# Patient Record
Sex: MALE | Race: WHITE | NOT HISPANIC OR LATINO
[De-identification: names, ages, dates, MRNs, and addresses within clinical notes are randomized per-mention and may not be internally consistent; named-entity substitution may affect disease eponyms.]

---

## 2017-10-25 ENCOUNTER — RECORD ABSTRACTING (OUTPATIENT)
Age: 42
End: 2017-10-25

## 2017-10-26 ENCOUNTER — RX RENEWAL (OUTPATIENT)
Age: 42
End: 2017-10-26

## 2017-11-27 ENCOUNTER — APPOINTMENT (OUTPATIENT)
Dept: NEUROLOGY | Facility: CLINIC | Age: 42
End: 2017-11-27
Payer: COMMERCIAL

## 2017-11-27 VITALS
HEIGHT: 64 IN | WEIGHT: 147 LBS | BODY MASS INDEX: 25.1 KG/M2 | SYSTOLIC BLOOD PRESSURE: 114 MMHG | OXYGEN SATURATION: 96 % | HEART RATE: 64 BPM | DIASTOLIC BLOOD PRESSURE: 66 MMHG

## 2017-11-27 DIAGNOSIS — Z78.9 OTHER SPECIFIED HEALTH STATUS: ICD-10-CM

## 2017-11-27 PROCEDURE — 99214 OFFICE O/P EST MOD 30 MIN: CPT

## 2017-11-27 RX ORDER — CARBAMAZEPINE 400 MG/1
400 TABLET, EXTENDED RELEASE ORAL
Qty: 120 | Refills: 3 | Status: DISCONTINUED | COMMUNITY
Start: 2017-10-26 | End: 2017-11-27

## 2017-11-28 LAB — CARBAMAZEPINE SERPL-MCNC: 9.4 UG/ML

## 2018-01-26 ENCOUNTER — APPOINTMENT (OUTPATIENT)
Dept: NEUROLOGY | Facility: CLINIC | Age: 43
End: 2018-01-26
Payer: COMMERCIAL

## 2018-01-26 PROCEDURE — 95819 EEG AWAKE AND ASLEEP: CPT

## 2018-01-27 ENCOUNTER — APPOINTMENT (OUTPATIENT)
Dept: NEUROLOGY | Facility: CLINIC | Age: 43
End: 2018-01-27

## 2018-01-27 PROCEDURE — 95953: CPT

## 2018-02-26 ENCOUNTER — APPOINTMENT (OUTPATIENT)
Dept: NEUROLOGY | Facility: CLINIC | Age: 43
End: 2018-02-26
Payer: COMMERCIAL

## 2018-02-26 VITALS
HEIGHT: 64 IN | SYSTOLIC BLOOD PRESSURE: 108 MMHG | OXYGEN SATURATION: 96 % | TEMPERATURE: 98.2 F | BODY MASS INDEX: 25.1 KG/M2 | WEIGHT: 147 LBS | DIASTOLIC BLOOD PRESSURE: 71 MMHG | HEART RATE: 73 BPM

## 2018-02-26 DIAGNOSIS — Z00.00 ENCOUNTER FOR GENERAL ADULT MEDICAL EXAMINATION W/OUT ABNORMAL FINDINGS: ICD-10-CM

## 2018-02-26 PROCEDURE — 99214 OFFICE O/P EST MOD 30 MIN: CPT

## 2018-04-23 ENCOUNTER — APPOINTMENT (OUTPATIENT)
Dept: NEUROLOGY | Facility: CLINIC | Age: 43
End: 2018-04-23

## 2018-06-11 ENCOUNTER — APPOINTMENT (OUTPATIENT)
Dept: NEUROLOGY | Facility: CLINIC | Age: 43
End: 2018-06-11
Payer: COMMERCIAL

## 2018-06-28 ENCOUNTER — APPOINTMENT (OUTPATIENT)
Dept: NEUROLOGY | Facility: CLINIC | Age: 43
End: 2018-06-28

## 2018-07-16 ENCOUNTER — APPOINTMENT (OUTPATIENT)
Dept: NEUROLOGY | Facility: CLINIC | Age: 43
End: 2018-07-16
Payer: COMMERCIAL

## 2018-07-16 VITALS
HEIGHT: 64 IN | OXYGEN SATURATION: 98 % | BODY MASS INDEX: 24.75 KG/M2 | TEMPERATURE: 98.4 F | HEART RATE: 62 BPM | SYSTOLIC BLOOD PRESSURE: 108 MMHG | WEIGHT: 145 LBS | DIASTOLIC BLOOD PRESSURE: 71 MMHG

## 2018-07-16 PROCEDURE — 99214 OFFICE O/P EST MOD 30 MIN: CPT

## 2018-07-17 LAB — CARBAMAZEPINE SERPL-MCNC: 7 UG/ML

## 2018-08-08 ENCOUNTER — APPOINTMENT (OUTPATIENT)
Dept: MRI IMAGING | Facility: HOSPITAL | Age: 43
End: 2018-08-08
Payer: COMMERCIAL

## 2018-08-08 ENCOUNTER — OUTPATIENT (OUTPATIENT)
Dept: OUTPATIENT SERVICES | Facility: HOSPITAL | Age: 43
LOS: 1 days | End: 2018-08-08
Payer: COMMERCIAL

## 2018-08-08 PROCEDURE — 70551 MRI BRAIN STEM W/O DYE: CPT

## 2018-08-08 PROCEDURE — 70551 MRI BRAIN STEM W/O DYE: CPT | Mod: 26

## 2018-11-27 ENCOUNTER — RX RENEWAL (OUTPATIENT)
Age: 43
End: 2018-11-27

## 2019-05-06 ENCOUNTER — APPOINTMENT (OUTPATIENT)
Dept: NEUROLOGY | Facility: CLINIC | Age: 44
End: 2019-05-06
Payer: COMMERCIAL

## 2019-05-06 VITALS
WEIGHT: 145 LBS | BODY MASS INDEX: 24.75 KG/M2 | OXYGEN SATURATION: 96 % | TEMPERATURE: 98.5 F | SYSTOLIC BLOOD PRESSURE: 107 MMHG | HEART RATE: 76 BPM | DIASTOLIC BLOOD PRESSURE: 69 MMHG | HEIGHT: 64 IN

## 2019-05-06 PROCEDURE — 99214 OFFICE O/P EST MOD 30 MIN: CPT

## 2019-05-06 NOTE — HISTORY OF PRESENT ILLNESS
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 \par \par Previous history: \par \par Had a questionable seizure episode at work about 1 month ago (June 18th, 2018). Felt sick to the stomach and started to feel weak. Walked out of a work meeting and was walking to the bathroom and then fell. Lost consciousness for a few minutes. Denies any visual aura before the fall; also denies any tongue bite or bowel/bladder incontinence. This event was witnessed by a co-worker, who said that there was no shaking witnessed. The patient stayed on the ground for a few minutes before getting up on his own. Felt back to his baseline after 10 minutes. \par \par Reports seeing visual aura about 1 time per week with a white/yellow colored light. Usually lasts 1-2 seconds. Reports that about 2 months ago, saw this visual aura on and off every few minutes for half of a day. \par \par Normal seizure semiology: \par Sees a light and then it gets bigger and bigger to the point where it fills the whole visual field. Then progresses to vomiting and whole body shaking. \par \par Reports getting occipital headaches (pressure in the back of the head) intermittently since the last visit. Takes Advil OTC, which alleviates the headache partially. \par \par Reports memory loss / problems with short-term memory. Ex. cannot remember where he placed a drill after a few minutes (works as a ). Says this has been declining over 2-3 years. Neuro-psych testing is waiting to be approved by insurance. Not done yet\par \par AED Meds: \par \par Carbamazepine 400 mg tid \par \par Was recently switched from the brand name Tegretol to the generic carbamazepine 9 months ago. He has many problems w the generic. \par Memory still bad. \par \par Some anxiety.\par \par Having some auras w visual changes. \par

## 2019-05-06 NOTE — PHYSICAL EXAM
[Person] : oriented to person [Place] : oriented to place [Time] : oriented to time [Concentration Intact] : normal concentrating ability [Visual Intact] : visual attention was ~T not ~L decreased [Naming Objects] : no difficulty naming common objects [Repeating Phrases] : no difficulty repeating a phrase [Writing A Sentence] : no difficulty writing a sentence [Fluency] : fluency intact [Comprehension] : comprehension intact [Reading] : reading intact [Past History] : adequate knowledge of personal past history [Cranial Nerves Optic (II)] : visual acuity intact bilaterally,  visual fields full to confrontation, pupils equal round and reactive to light [Cranial Nerves Oculomotor (III)] : extraocular motion intact [Cranial Nerves Trigeminal (V)] : facial sensation intact symmetrically [Cranial Nerves Facial (VII)] : face symmetrical [Cranial Nerves Vestibulocochlear (VIII)] : hearing was intact bilaterally [Motor Tone] : muscle tone was normal in all four extremities [Motor Strength] : muscle strength was normal in all four extremities [No Muscle Atrophy] : normal bulk in all four extremities [Motor Handedness Right-Handed] : the patient is right hand dominant [Sensation Tactile Decrease] : light touch was intact [Balance] : balance was intact [Abnormal Walk] : normal gait [2+] : Ankle jerk left 2+ [Tremor] : no tremor present [Past-pointing] : there was no past-pointing [Plantar Reflex Left Only] : normal on the left [Plantar Reflex Right Only] : normal on the right

## 2019-05-06 NOTE — REVIEW OF SYSTEMS
[Anxiety] : anxiety [Memory Lapses or Loss] : memory loss [Decr. Concentrating Ability] : decreased concentrating ability [Negative] : Heme/Lymph

## 2019-05-07 LAB
ALBUMIN SERPL ELPH-MCNC: 4.5 G/DL
ALP BLD-CCNC: 93 U/L
ALT SERPL-CCNC: 19 U/L
AST SERPL-CCNC: 24 U/L
BASOPHILS # BLD AUTO: 0.04 K/UL
BASOPHILS NFR BLD AUTO: 0.9 %
BILIRUB DIRECT SERPL-MCNC: 0.1 MG/DL
BILIRUB INDIRECT SERPL-MCNC: 0.2 MG/DL
BILIRUB SERPL-MCNC: 0.3 MG/DL
CARBAMAZEPINE SERPL-MCNC: 10 UG/ML
CHOLEST SERPL-MCNC: 168 MG/DL
CHOLEST/HDLC SERPL: 2.9 RATIO
EOSINOPHIL # BLD AUTO: 0.05 K/UL
EOSINOPHIL NFR BLD AUTO: 1.1 %
HCT VFR BLD CALC: 44.5 %
HDLC SERPL-MCNC: 58 MG/DL
HGB BLD-MCNC: 14.5 G/DL
IMM GRANULOCYTES NFR BLD AUTO: 0.2 %
LDLC SERPL CALC-MCNC: 99 MG/DL
LYMPHOCYTES # BLD AUTO: 1.15 K/UL
LYMPHOCYTES NFR BLD AUTO: 24.8 %
MAN DIFF?: NORMAL
MCHC RBC-ENTMCNC: 31.7 PG
MCHC RBC-ENTMCNC: 32.6 GM/DL
MCV RBC AUTO: 97.2 FL
MONOCYTES # BLD AUTO: 0.66 K/UL
MONOCYTES NFR BLD AUTO: 14.2 %
NEUTROPHILS # BLD AUTO: 2.73 K/UL
NEUTROPHILS NFR BLD AUTO: 58.8 %
PLATELET # BLD AUTO: 280 K/UL
PROT SERPL-MCNC: 7.2 G/DL
RBC # BLD: 4.58 M/UL
RBC # FLD: 11.8 %
TRIGL SERPL-MCNC: 54 MG/DL
WBC # FLD AUTO: 4.64 K/UL

## 2019-05-21 ENCOUNTER — OTHER (OUTPATIENT)
Age: 44
End: 2019-05-21

## 2019-06-04 ENCOUNTER — APPOINTMENT (OUTPATIENT)
Dept: NEUROLOGY | Facility: CLINIC | Age: 44
End: 2019-06-04
Payer: COMMERCIAL

## 2019-06-04 PROCEDURE — 95819 EEG AWAKE AND ASLEEP: CPT

## 2019-09-09 ENCOUNTER — APPOINTMENT (OUTPATIENT)
Dept: NEUROLOGY | Facility: CLINIC | Age: 44
End: 2019-09-09
Payer: COMMERCIAL

## 2019-09-09 VITALS
BODY MASS INDEX: 24.75 KG/M2 | HEART RATE: 70 BPM | TEMPERATURE: 98.3 F | OXYGEN SATURATION: 98 % | HEIGHT: 64 IN | WEIGHT: 145 LBS | SYSTOLIC BLOOD PRESSURE: 105 MMHG | DIASTOLIC BLOOD PRESSURE: 68 MMHG

## 2019-09-09 PROCEDURE — 99214 OFFICE O/P EST MOD 30 MIN: CPT

## 2019-09-09 NOTE — DISCUSSION/SUMMARY
[FreeTextEntry1] : 45 y/o male w focal occipital epilepsy. doing well\par \par Memory loss. Multifactorial

## 2019-09-09 NOTE — PHYSICAL EXAM
[Person] : oriented to person [Place] : oriented to place [Time] : oriented to time [Concentration Intact] : normal concentrating ability [Visual Intact] : visual attention was ~T not ~L decreased [Naming Objects] : no difficulty naming common objects [Repeating Phrases] : no difficulty repeating a phrase [Writing A Sentence] : no difficulty writing a sentence [Fluency] : fluency intact [Reading] : reading intact [Comprehension] : comprehension intact [Past History] : adequate knowledge of personal past history [Cranial Nerves Oculomotor (III)] : extraocular motion intact [Cranial Nerves Optic (II)] : visual acuity intact bilaterally,  visual fields full to confrontation, pupils equal round and reactive to light [Cranial Nerves Trigeminal (V)] : facial sensation intact symmetrically [Cranial Nerves Facial (VII)] : face symmetrical [Cranial Nerves Vestibulocochlear (VIII)] : hearing was intact bilaterally [Motor Tone] : muscle tone was normal in all four extremities [Motor Strength] : muscle strength was normal in all four extremities [No Muscle Atrophy] : normal bulk in all four extremities [Motor Handedness Right-Handed] : the patient is right hand dominant [Sensation Tactile Decrease] : light touch was intact [Abnormal Walk] : normal gait [Balance] : balance was intact [2+] : Ankle jerk left 2+ [Past-pointing] : there was no past-pointing [Tremor] : no tremor present [Plantar Reflex Right Only] : normal on the right [Plantar Reflex Left Only] : normal on the left [FreeTextEntry4] : 2/3

## 2019-09-09 NOTE — HISTORY OF PRESENT ILLNESS
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 \par \par Previous history: \par \par Had a questionable seizure episode at work about 1 month ago (June 18th, 2018). Felt sick to the stomach and started to feel weak. Walked out of a work meeting and was walking to the bathroom and then fell. Lost consciousness for a few minutes. Denies any visual aura before the fall; also denies any tongue bite or bowel/bladder incontinence. This event was witnessed by a co-worker, who said that there was no shaking witnessed. The patient stayed on the ground for a few minutes before getting up on his own. Felt back to his baseline after 10 minutes. \par \par Reports seeing visual aura about 1 time per week with a white/yellow colored light. Usually lasts 1-2 seconds. Reports that about 2 months ago, saw this visual aura on and off every few minutes for half of a day. \par \par Normal seizure semiology: \par Sees a light and then it gets bigger and bigger to the point where it fills the whole visual field. Then progresses to vomiting and whole body shaking. \par \par Reports getting occipital headaches (pressure in the back of the head) intermittently since the last visit. Takes Advil OTC, which alleviates the headache partially. \par \par Reports memory loss / problems with short-term memory. Ex. cannot remember where he placed a drill after a few minutes (works as a ). Says this has been declining over 2-3 years. Neuro-psych testing is waiting to be approved by insurance. Not done yet\par \par AED Meds: \par \par Carbamazepine 400 mg tid \par \par Was recently switched from the brand name Tegretol to the generic carbamazepine 9 months ago. He has many problems w the generic. \par Memory still bad. \par \par Some anxiety.\par HPI\par Stable\par No seizures\par CBZ levels was 10\par Memory loss. June 2019 EEG was normal\par \par New job weekend. \par

## 2020-03-02 ENCOUNTER — LABORATORY RESULT (OUTPATIENT)
Age: 45
End: 2020-03-02

## 2020-03-02 ENCOUNTER — APPOINTMENT (OUTPATIENT)
Dept: NEUROLOGY | Facility: CLINIC | Age: 45
End: 2020-03-02
Payer: COMMERCIAL

## 2020-03-02 VITALS
HEART RATE: 63 BPM | DIASTOLIC BLOOD PRESSURE: 72 MMHG | BODY MASS INDEX: 24.75 KG/M2 | SYSTOLIC BLOOD PRESSURE: 104 MMHG | OXYGEN SATURATION: 98 % | TEMPERATURE: 98 F | WEIGHT: 145 LBS | HEIGHT: 64 IN

## 2020-03-02 PROCEDURE — 99214 OFFICE O/P EST MOD 30 MIN: CPT

## 2020-03-03 LAB
ALBUMIN SERPL ELPH-MCNC: 4.5 G/DL
ALP BLD-CCNC: 84 U/L
ALT SERPL-CCNC: 17 U/L
ANION GAP SERPL CALC-SCNC: 13 MMOL/L
AST SERPL-CCNC: 19 U/L
BASOPHILS # BLD AUTO: 0.05 K/UL
BASOPHILS NFR BLD AUTO: 1.3 %
BILIRUB SERPL-MCNC: 0.2 MG/DL
BUN SERPL-MCNC: 16 MG/DL
CALCIUM SERPL-MCNC: 8.7 MG/DL
CARBAMAZEPINE SERPL-MCNC: 8.5 UG/ML
CHLORIDE SERPL-SCNC: 103 MMOL/L
CO2 SERPL-SCNC: 26 MMOL/L
CREAT SERPL-MCNC: 1.13 MG/DL
EOSINOPHIL # BLD AUTO: 0.14 K/UL
EOSINOPHIL NFR BLD AUTO: 3.5 %
GLUCOSE SERPL-MCNC: 81 MG/DL
HCT VFR BLD CALC: 43.9 %
HGB BLD-MCNC: 14.3 G/DL
IMM GRANULOCYTES NFR BLD AUTO: 0 %
LYMPHOCYTES # BLD AUTO: 1.44 K/UL
LYMPHOCYTES NFR BLD AUTO: 36 %
MAN DIFF?: NORMAL
MCHC RBC-ENTMCNC: 32.1 PG
MCHC RBC-ENTMCNC: 32.6 GM/DL
MCV RBC AUTO: 98.4 FL
MONOCYTES # BLD AUTO: 0.54 K/UL
MONOCYTES NFR BLD AUTO: 13.5 %
NEUTROPHILS # BLD AUTO: 1.83 K/UL
NEUTROPHILS NFR BLD AUTO: 45.7 %
PLATELET # BLD AUTO: 177 K/UL
POTASSIUM SERPL-SCNC: 3.9 MMOL/L
PROT SERPL-MCNC: 6.6 G/DL
RBC # BLD: 4.46 M/UL
RBC # FLD: 12.3 %
SODIUM SERPL-SCNC: 142 MMOL/L
WBC # FLD AUTO: 4 K/UL

## 2020-03-05 NOTE — ASSESSMENT
[FreeTextEntry1] : Continue tegretol\par Ref to Neuropsychology for memory loss. \par KUSUM and RTC in September.

## 2020-03-05 NOTE — HISTORY OF PRESENT ILLNESS
[FreeTextEntry1] : Jeferson is a 43 y/o male with occipital focal epilepsy. Last seen in September 2019. \par \par He has been fine except for one day where he felt like he was going to have a seizure the whole day. During that day he was at work so he just avoided lights and other triggers and eventually it passed. he described this aura as staring at a light and then when you look away you keep seeing the light. This episode happened last week.  \par \par Still complaints of poor short term memory. \par \par Previous history: \cristy Had a questionable seizure episode at work (June 18th, 2018). Felt sick to the stomach and started to feel weak. Walked out of a work meeting and was walking to the bathroom and then fell. Lost consciousness for a few minutes. Denies any visual aura before the fall; also denies any tongue bite or bowel/bladder incontinence. This event was witnessed by a co-worker, who said that there was no shaking witnessed. The patient stayed on the ground for a few minutes before getting up on his own. Felt back to his baseline after 10 minutes. \par \par Reports seeing visual aura about 1 time per week with a white/yellow colored light. Usually lasts 1-2 seconds. Reports that about 2 months ago, saw this visual aura on and off every few minutes for half of a day. \par \par Normal seizure semiology: \cristy Sees a light and then it gets bigger and bigger to the point where it fills the whole visual field. Then progresses to vomiting and whole body shaking. \par \par Reports getting occipital headaches (pressure in the back of the head) intermittently since the last visit. Takes Advil OTC, which alleviates the headache partially. \par \par Reports memory loss / problems with short-term memory. Ex. cannot remember where he placed a drill after a few minutes (works as a ). Says this has been declining over 2-3 years. Neuro-psych testing is waiting to be approved by insurance. Not done yet

## 2020-03-05 NOTE — PHYSICAL EXAM
[FreeTextEntry1] : Neurologic: \par Mental Status: 3/3. \par Orientation: oriented to person, oriented to place and oriented to time. \par Attention: normal concentrating ability and visual attention was not decreased. \par Language: no difficulty naming common objects, no difficulty repeating a phrase, no difficulty writing a sentence, fluency intact, comprehension intact and reading intact. \par Fund of knowledge: displays adequate knowledge of personal past history. \par Cranial Nerves: visual acuity intact bilaterally, visual fields full to confrontation, pupils equal round and reactive to light, extraocular motion intact, facial sensation intact symmetrically, face symmetrical and hearing was intact bilaterally. \par Motor: muscle tone was normal in all four extremities, muscle strength was normal in all four extremities and normal bulk in all four extremities. \par Motor Strength:. the patient is right hand dominant. \par Sensory exam: light touch was intact. \par Coordination:. normal gait. balance was intact. there was no past-pointing. no tremor present. \par Deep tendon reflexes: \par Biceps right 2+. Biceps left 2+.  \par Triceps right 2+. Triceps left 2+.  \par Brachioradialis right 2+. Brachioradialis left 2+.  \par Patella right 2+. Patella left 2+.  \par Ankle jerk right 2+. Ankle jerk left 2+. \par Plantar responses normal on the right, normal on the left.

## 2020-03-05 NOTE — DISCUSSION/SUMMARY
[FreeTextEntry1] : 45 y/o male w focal occipital epilepsy. doing well\par \par Memory loss. Multifactorial.

## 2020-03-05 NOTE — REVIEW OF SYSTEMS
[FreeTextEntry1] : Neurological: memory loss and convulsions. \par Constitutional, Psychiatric, Gastrointestinal, Genitourinary, Musculoskeletal and Integumentary are otherwise negative. \par Neurological: memory loss and convulsions. \par Constitutional, Psychiatric, Gastrointestinal, Genitourinary, Musculoskeletal and Integumentary are otherwise negative. \par Neurological: memory loss and convulsions. \par Constitutional, Psychiatric, Gastrointestinal, Genitourinary, Musculoskeletal and Integumentary are otherwise negative.

## 2020-09-11 ENCOUNTER — APPOINTMENT (OUTPATIENT)
Dept: NEUROLOGY | Facility: CLINIC | Age: 45
End: 2020-09-11
Payer: COMMERCIAL

## 2020-09-11 PROCEDURE — 95819 EEG AWAKE AND ASLEEP: CPT

## 2020-09-14 ENCOUNTER — APPOINTMENT (OUTPATIENT)
Dept: NEUROLOGY | Facility: CLINIC | Age: 45
End: 2020-09-14
Payer: COMMERCIAL

## 2020-09-14 VITALS
SYSTOLIC BLOOD PRESSURE: 100 MMHG | HEIGHT: 64 IN | HEART RATE: 80 BPM | TEMPERATURE: 98.3 F | OXYGEN SATURATION: 98 % | DIASTOLIC BLOOD PRESSURE: 66 MMHG

## 2020-09-14 PROCEDURE — 99214 OFFICE O/P EST MOD 30 MIN: CPT

## 2020-09-14 RX ORDER — CARBAMAZEPINE 400 MG/1
400 TABLET, EXTENDED RELEASE ORAL EVERY 8 HOURS
Qty: 270 | Refills: 1 | Status: ACTIVE | COMMUNITY
Start: 1900-01-01 | End: 1900-01-01

## 2020-09-14 NOTE — REVIEW OF SYSTEMS
[As Noted in HPI] : as noted in HPI [Eyesight Problems] : eyesight problems [Negative] : Constitutional [Seizures] : no convulsions [Dizziness] : no dizziness [Fainting] : no fainting [Lightheadedness] : no lightheadedness [Vertigo] : no vertigo [Cluster Headache] : no cluster headache [Migraine Headache] : no migraine headache [Tension Headache] : no tension-type headache [Difficulty Walking] : no difficulty walking [Ataxia] : no ataxia [Inability to Walk] : able to walk [Frequent Falls] : not falling [Limping] : not limping

## 2020-09-14 NOTE — DISCUSSION/SUMMARY
[FreeTextEntry1] : 44 y/o male w focal occipital epilepsy. doing well\par \par Memory loss. Multifactorial.

## 2020-09-14 NOTE — PHYSICAL EXAM
[FreeTextEntry1] : \par Neurologic: \par Mental Status: 3/3. \par Orientation: oriented to person, oriented to place and oriented to time. \par Attention: normal concentrating ability and visual attention was not decreased. \par Language: no difficulty naming common objects, no difficulty repeating a phrase, no difficulty writing a sentence, fluency intact, comprehension intact and reading intact. \par Fund of knowledge: displays adequate knowledge of personal past history. \par Cranial Nerves: visual acuity intact bilaterally, visual fields full to confrontation, pupils equal round and reactive to light, extraocular motion intact, facial sensation intact symmetrically, face symmetrical and hearing was intact bilaterally. \par Motor: muscle tone was normal in all four extremities, muscle strength was normal in all four extremities and normal bulk in all four extremities. \par Motor Strength:. the patient is right hand dominant. \par Sensory exam: light touch was intact. \par Coordination:. normal gait. balance was intact. there was no past-pointing. no tremor present. \par Deep tendon reflexes: \par Biceps right 2+. Biceps left 2+. \par Triceps right 2+. Triceps left 2+. \par Brachioradialis right 2+. Brachioradialis left 2+. \par Patella right 2+. Patella left 2+. \par Ankle jerk right 2+. Ankle jerk left 2+. \par Plantar responses normal on the right, normal on the left.

## 2020-09-14 NOTE — ASSESSMENT
[FreeTextEntry1] : Continue tegretol 400 mg TID\par Ref to Neuropsychology for memory loss. \par RTC in 6 months\par

## 2020-11-24 ENCOUNTER — APPOINTMENT (OUTPATIENT)
Dept: NEUROLOGY | Facility: CLINIC | Age: 45
End: 2020-11-24

## 2021-03-30 ENCOUNTER — APPOINTMENT (OUTPATIENT)
Dept: NEUROLOGY | Facility: CLINIC | Age: 46
End: 2021-03-30
Payer: COMMERCIAL

## 2021-03-30 VITALS
HEART RATE: 76 BPM | DIASTOLIC BLOOD PRESSURE: 74 MMHG | TEMPERATURE: 98.4 F | HEIGHT: 64 IN | BODY MASS INDEX: 22.2 KG/M2 | OXYGEN SATURATION: 97 % | SYSTOLIC BLOOD PRESSURE: 116 MMHG | WEIGHT: 130 LBS

## 2021-03-30 DIAGNOSIS — R41.3 OTHER AMNESIA: ICD-10-CM

## 2021-03-30 PROCEDURE — 99214 OFFICE O/P EST MOD 30 MIN: CPT

## 2021-03-30 PROCEDURE — 99072 ADDL SUPL MATRL&STAF TM PHE: CPT

## 2021-03-30 NOTE — HISTORY OF PRESENT ILLNESS
[FreeTextEntry1] : Follow up\par \par 46 y/o male with occipital focal epilepsy. \par \par PMHx\par \par Had a questionable seizure episode at work about 1 month ago (June 18th, 2018). Felt sick to the stomach and started to feel weak. Walked out of a work meeting and was walking to the bathroom and then fell. Lost consciousness for a few minutes. Denies any visual aura before the fall; also denies any tongue bite or bowel/bladder incontinence. This event was witnessed by a co-worker, who said that there was no shaking witnessed. The patient stayed on the ground for a few minutes before getting up on his own. Felt back to his baseline after 10 minutes. \par \par Reports seeing visual aura about 1 time per week with a white/yellow colored light. Usually lasts 1-2 seconds. Reports that about 2 months ago, saw this visual aura on and off every few minutes for half of a day. \par \par Normal seizure semiology: \par Sees a light and then it gets bigger and bigger to the point where it fills the whole visual field. Then progresses to vomiting and whole body shaking. \par \par Reports getting occipital headaches (pressure in the back of the head) intermittently since the last visit. Takes Advil OTC, which alleviates the headache partially. \par \par Reports memory loss / problems with short-term memory. Ex. cannot remember where he placed a drill after a few minutes (works as a ). Says this has been declining over 2-3 years. Neuro-psych testing is waiting to be approved by insurance. Not done yet\par \par \par Was recently switched from the brand name Tegretol to the generic carbamazepine 9 months ago. He has many problems w the generic. \par Memory still bad. \par \par Some anxiety.\par \par HPI\par \par No COVID.\par Got the first vaccine yesterday \par Stable\par No seizures\par \par AED Meds: \par \par Carbamazepine brand  mg tid \par \par \par Job: slow. 1/day a week\par

## 2021-03-30 NOTE — DISCUSSION/SUMMARY
[FreeTextEntry1] : 44 y/o male w focal occipital epilepsy. doing well\par \par Memory loss. Multifactorial\par Doing well on Brabd CBZ since he was switched w no auras

## 2021-03-30 NOTE — PHYSICAL EXAM
[Person] : oriented to person [Place] : oriented to place [Time] : oriented to time [Visual Intact] : visual attention was ~T not ~L decreased [Concentration Intact] : normal concentrating ability [Naming Objects] : no difficulty naming common objects [Repeating Phrases] : no difficulty repeating a phrase [Writing A Sentence] : no difficulty writing a sentence [Fluency] : fluency intact [Comprehension] : comprehension intact [Reading] : reading intact [Past History] : adequate knowledge of personal past history [Cranial Nerves Optic (II)] : visual acuity intact bilaterally,  visual fields full to confrontation, pupils equal round and reactive to light [Cranial Nerves Oculomotor (III)] : extraocular motion intact [Cranial Nerves Trigeminal (V)] : facial sensation intact symmetrically [Cranial Nerves Facial (VII)] : face symmetrical [Cranial Nerves Vestibulocochlear (VIII)] : hearing was intact bilaterally [Motor Tone] : muscle tone was normal in all four extremities [Motor Strength] : muscle strength was normal in all four extremities [No Muscle Atrophy] : normal bulk in all four extremities [Sensation Tactile Decrease] : light touch was intact [Motor Handedness Right-Handed] : the patient is right hand dominant [Abnormal Walk] : normal gait [Balance] : balance was intact [2+] : Ankle jerk left 2+ [Past-pointing] : there was no past-pointing [Tremor] : no tremor present [Plantar Reflex Right Only] : normal on the right [Plantar Reflex Left Only] : normal on the left [FreeTextEntry4] : 2/3

## 2021-04-01 LAB
ALBUMIN SERPL ELPH-MCNC: 4.5 G/DL
ALP BLD-CCNC: 83 U/L
ALT SERPL-CCNC: 20 U/L
AST SERPL-CCNC: 20 U/L
BASOPHILS # BLD AUTO: 0.03 K/UL
BASOPHILS NFR BLD AUTO: 0.6 %
BILIRUB DIRECT SERPL-MCNC: 0.1 MG/DL
BILIRUB INDIRECT SERPL-MCNC: 0.2 MG/DL
BILIRUB SERPL-MCNC: 0.2 MG/DL
CARBAMAZEPINE SERPL-MCNC: 6.2 UG/ML
EOSINOPHIL # BLD AUTO: 0.02 K/UL
EOSINOPHIL NFR BLD AUTO: 0.4 %
HCT VFR BLD CALC: 44 %
HGB BLD-MCNC: 14.4 G/DL
IMM GRANULOCYTES NFR BLD AUTO: 0.2 %
LYMPHOCYTES # BLD AUTO: 1.09 K/UL
LYMPHOCYTES NFR BLD AUTO: 22.1 %
MAN DIFF?: NORMAL
MCHC RBC-ENTMCNC: 31.5 PG
MCHC RBC-ENTMCNC: 32.7 GM/DL
MCV RBC AUTO: 96.3 FL
MONOCYTES # BLD AUTO: 0.36 K/UL
MONOCYTES NFR BLD AUTO: 7.3 %
NEUTROPHILS # BLD AUTO: 3.42 K/UL
NEUTROPHILS NFR BLD AUTO: 69.4 %
PLATELET # BLD AUTO: 190 K/UL
PROT SERPL-MCNC: 6.5 G/DL
RBC # BLD: 4.57 M/UL
RBC # FLD: 12.3 %
WBC # FLD AUTO: 4.93 K/UL

## 2021-04-12 ENCOUNTER — APPOINTMENT (OUTPATIENT)
Dept: NEUROLOGY | Facility: CLINIC | Age: 46
End: 2021-04-12
Payer: COMMERCIAL

## 2021-04-12 PROCEDURE — 95819 EEG AWAKE AND ASLEEP: CPT

## 2021-04-12 PROCEDURE — 99072 ADDL SUPL MATRL&STAF TM PHE: CPT

## 2021-10-05 ENCOUNTER — APPOINTMENT (OUTPATIENT)
Dept: NEUROLOGY | Facility: CLINIC | Age: 46
End: 2021-10-05

## 2022-03-07 ENCOUNTER — RX RENEWAL (OUTPATIENT)
Age: 47
End: 2022-03-07

## 2022-10-31 NOTE — HISTORY OF PRESENT ILLNESS
Impression: Dry eye syndrome of bilateral lacrimal glands: H04.123. Plan: Discussed diagnosis with patient. Recommend OTC artificial tears in OU for comfort, 3-4x's a day. [FreeTextEntry1] : Jeferson is a 46 y/o male with occipital focal epilepsy. Last seen in March 2, 2020. Last visual aura was 2 months ago, but the light went away and patient denies seizures. \par \par Still complaints of poor short term memory for the past 3 years. For the last 6 months, patient also having difficulty comprehending and focusing. \par \par Last EEG was one week ago. \par \par 1 year history of vision worse and blurry upon awakening\par \par Previous history:\par He has been fine except for one day where he felt like he was going to have a seizure the whole day. During that day he was at work so he just avoided lights and other triggers and eventually it passed. He described this aura as staring at a light and then when you look away you keep seeing the light. This episode happened last week. \par \cristy Had a questionable seizure episode at work (June 18th, 2018). Felt sick to the stomach and started to feel weak. Walked out of a work meeting and was walking to the bathroom and then fell. Lost consciousness for a few minutes. Denies any visual aura before the fall; also denies any tongue bite or bowel/bladder incontinence. This event was witnessed by a co-worker, who said that there was no shaking witnessed. The patient stayed on the ground for a few minutes before getting up on his own. Felt back to his baseline after 10 minutes. \par \par Reports seeing visual aura about 1 time per week with a white/yellow colored light. Usually lasts 1-2 seconds. Reports that about 2 months ago, saw this visual aura on and off every few minutes for half of a day. \par \par Normal seizure semiology: \par Sees a light and then it gets bigger and bigger to the point where it fills the whole visual field. Then progresses to vomiting and whole body shaking. \par \par Reports getting occipital headaches (pressure in the back of the head) intermittently since the last visit. Takes Advil OTC, which alleviates the headache partially. \par \par Reports memory loss / problems with short-term memory. Ex. cannot remember where he placed a drill after a few minutes (works as a ). Says this has been declining over 2-3 years. Neuro-psych testing is waiting to be approved by insurance. Not done yet \par

## 2023-07-25 ENCOUNTER — APPOINTMENT (OUTPATIENT)
Dept: NEUROLOGY | Facility: CLINIC | Age: 48
End: 2023-07-25
Payer: COMMERCIAL

## 2023-07-25 VITALS
WEIGHT: 140 LBS | HEIGHT: 64 IN | TEMPERATURE: 98 F | BODY MASS INDEX: 23.9 KG/M2 | OXYGEN SATURATION: 96 % | DIASTOLIC BLOOD PRESSURE: 60 MMHG | RESPIRATION RATE: 17 BRPM | SYSTOLIC BLOOD PRESSURE: 105 MMHG | HEART RATE: 66 BPM

## 2023-07-25 PROCEDURE — 99214 OFFICE O/P EST MOD 30 MIN: CPT

## 2023-07-25 NOTE — DISCUSSION/SUMMARY
[FreeTextEntry1] : 48 year old male with focal occipital epilepsy. Well controlled on Tegretol 400mg BID. No history of Dexa scan

## 2023-07-25 NOTE — END OF VISIT
[FreeTextEntry3] : I personally saw and evaluated this patient with CALIXTO [place] and agree with the history, exam, and plan as outlined in this note.\par

## 2023-07-25 NOTE — HISTORY OF PRESENT ILLNESS
[FreeTextEntry1] : 7/25/23 HPI:\par \par Jeferson is a 48 year old male presenting for a follow up visit for epilepsy.\par \par Seizures well controlled on brand name Carbamazepine 400mg BID. Can't remember his last seizure but notes it has been at least a year. \par \par Prior:\par Last seen 3/30/21\par \par PMHx\par \par Had a questionable seizure episode at work about 1 month ago (June 18th, 2018). Felt sick to the stomach and started to feel weak. Walked out of a work meeting and was walking to the bathroom and then fell. Lost consciousness for a few minutes. Denies any visual aura before the fall; also denies any tongue bite or bowel/bladder incontinence. This event was witnessed by a co-worker, who said that there was no shaking witnessed. The patient stayed on the ground for a few minutes before getting up on his own. Felt back to his baseline after 10 minutes. \par \par Reports seeing visual aura about 1 time per week with a white/yellow colored light. Usually lasts 1-2 seconds. Reports that about 2 months ago, saw this visual aura on and off every few minutes for half of a day. \par \par Normal seizure semiology: \par Sees a light and then it gets bigger and bigger to the point where it fills the whole visual field. Then progresses to vomiting and whole body shaking. \par \par Reports getting occipital headaches (pressure in the back of the head) intermittently since the last visit. Takes Advil OTC, which alleviates the headache partially. \par \par Reports memory loss / problems with short-term memory. Ex. cannot remember where he placed a drill after a few minutes (works as a ). Says this has been declining over 2-3 years. Neuro-psych testing is waiting to be approved by insurance. Not done yet\par \par \par Was recently switched from the brand name Tegretol to the generic carbamazepine 9 months ago. He has many problems w the generic. \par Memory still bad. \par \par Some anxiety.\par \par HPI\par \par No COVID.\par Got the first vaccine yesterday \par Stable\par No seizures\par \par AED Meds: \par \par Carbamazepine brand  mg tid \par \par \par Job: slow. 1/day a week\par

## 2023-07-25 NOTE — REVIEW OF SYSTEMS
[As Noted in HPI] : as noted in HPI [Fever] : no fever [Chills] : no chills [Chest Pain] : no chest pain [Shortness Of Breath] : no shortness of breath [Abdominal Pain] : no abdominal pain

## 2023-07-25 NOTE — PHYSICAL EXAM
[FreeTextEntry1] : General:\par Constitutional: Sitting comfortably in NAD.\par Psychiatric: well-groomed, appropriate affect\par Ears, Nose, Throat: no abnormalities, mucus membranes moist\par Neck: supple\par Extremities: no edema, clubbing or cyanosis\par Skin: no rash or neuro-cutaneous signs\par \par Cognitive:\par Orientation, language, memory and knowledge screens intact.\par \par Cranial Nerves:\par II: BYRON. III/IV/VI: EOM Full. VII: Face appears symmetric. VIII: Normal to screening. IX/X: Normal phonation. XI: Trapezius Symmetric. XII: Tongue midline. \par Motor: No tremor\par Power: No pronator drift.\par \par Normal gait.\par \par DTR 2+ x4 limbs\par  -2

## 2023-07-26 LAB
24R-OH-CALCIDIOL SERPL-MCNC: 61.7 PG/ML
ALBUMIN SERPL ELPH-MCNC: 4.5 G/DL
ALP BLD-CCNC: 80 U/L
ALT SERPL-CCNC: 15 U/L
ANION GAP SERPL CALC-SCNC: 12 MMOL/L
AST SERPL-CCNC: 20 U/L
BILIRUB SERPL-MCNC: 0.2 MG/DL
BUN SERPL-MCNC: 13 MG/DL
CALCIUM SERPL-MCNC: 9.2 MG/DL
CARBAMAZEPINE SERPL-MCNC: 5.4 UG/ML
CHLORIDE SERPL-SCNC: 110 MMOL/L
CO2 SERPL-SCNC: 27 MMOL/L
CREAT SERPL-MCNC: 0.85 MG/DL
EGFR: 106 ML/MIN/1.73M2
GLUCOSE SERPL-MCNC: 109 MG/DL
POTASSIUM SERPL-SCNC: 4 MMOL/L
PROT SERPL-MCNC: 6.3 G/DL
SODIUM SERPL-SCNC: 149 MMOL/L

## 2023-08-02 ENCOUNTER — RX RENEWAL (OUTPATIENT)
Age: 48
End: 2023-08-02

## 2023-08-02 RX ORDER — CARBAMAZEPINE 400 MG/1
400 TABLET, EXTENDED RELEASE ORAL
Qty: 270 | Refills: 3 | Status: ACTIVE | COMMUNITY
Start: 2017-11-27 | End: 1900-01-01

## 2023-09-13 ENCOUNTER — APPOINTMENT (OUTPATIENT)
Dept: RADIOLOGY | Facility: HOSPITAL | Age: 48
End: 2023-09-13
Payer: COMMERCIAL

## 2023-09-13 ENCOUNTER — OUTPATIENT (OUTPATIENT)
Dept: OUTPATIENT SERVICES | Facility: HOSPITAL | Age: 48
LOS: 1 days | End: 2023-09-13
Payer: COMMERCIAL

## 2023-09-13 PROCEDURE — 77080 DXA BONE DENSITY AXIAL: CPT | Mod: 26

## 2023-09-13 PROCEDURE — 77080 DXA BONE DENSITY AXIAL: CPT

## 2023-11-29 ENCOUNTER — APPOINTMENT (OUTPATIENT)
Dept: NEUROLOGY | Facility: CLINIC | Age: 48
End: 2023-11-29
Payer: COMMERCIAL

## 2023-11-29 PROCEDURE — 95819 EEG AWAKE AND ASLEEP: CPT

## 2024-02-08 ENCOUNTER — APPOINTMENT (OUTPATIENT)
Dept: NEUROLOGY | Facility: CLINIC | Age: 49
End: 2024-02-08

## 2024-02-13 ENCOUNTER — APPOINTMENT (OUTPATIENT)
Dept: NEUROLOGY | Facility: CLINIC | Age: 49
End: 2024-02-13
Payer: COMMERCIAL

## 2024-02-13 DIAGNOSIS — G40.109 LOCALIZATION-RELATED (FOCAL) (PARTIAL) SYMPTOMATIC EPILEPSY AND EPILEPTIC SYNDROMES WITH SIMPLE PARTIAL SEIZURES, NOT INTRACTABLE, W/OUT STATUS EPILEPTICUS: ICD-10-CM

## 2024-02-13 PROCEDURE — 99214 OFFICE O/P EST MOD 30 MIN: CPT

## 2024-02-13 PROCEDURE — G2211 COMPLEX E/M VISIT ADD ON: CPT

## 2024-02-13 NOTE — HISTORY OF PRESENT ILLNESS
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   HPI     2/13/24 No seizures Rare sec flashes which represent auas.  No LOC  Other medical issues: Fell on 12/30 during work. Fell down and injury spine. Says he needs surgery. He will have it done in a few weeks. Surgery [facility].    AED Meds:   Carbamazepine brand  mg BID   PMHx  Had a questionable seizure episode at work about 1 month ago (June 18th, 2018). Felt sick to the stomach and started to feel weak. Walked out of a work meeting and was walking to the bathroom and then fell. Lost consciousness for a few minutes. Denies any visual aura before the fall; also denies any tongue bite or bowel/bladder incontinence. This event was witnessed by a co-worker, who said that there was no shaking witnessed. The patient stayed on the ground for a few minutes before getting up on his own. Felt back to his baseline after 10 minutes.   Reports seeing visual aura about 1 time per week with a white/yellow colored light. Usually lasts 1-2 seconds. Reports that about 2 months ago, saw this visual aura on and off every few minutes for half of a day.   Normal seizure semiology:  Sees a light and then it gets bigger and bigger to the point where it fills the whole visual field. Then progresses to vomiting and whole body shaking.   Reports getting occipital headaches (pressure in the back of the head) intermittently since the last visit. Takes Advil OTC, which alleviates the headache partially.   Reports memory loss / problems with short-term memory. Ex. cannot remember where he placed a drill after a few minutes (works as a ). Says this has been declining over 2-3 years. Neuro-psych testing is waiting to be approved by insurance. Not done yet   Was recently switched from the brand name Tegretol to the generic carbamazepine 9 months ago. He has many problems w the generic.  Memory still bad.   Some anxiety.  HPI  No COVID. Got the first vaccine yesterday  Stable No seizures  AED Meds:   Carbamazepine brand  mg tid    Job: slow. 1/day a week

## 2024-02-13 NOTE — DISCUSSION/SUMMARY
[FreeTextEntry1] : 49 y/o male w focal occipital epilepsy. doing well Radiculopathy Cervical Memory loss. Multifactorial

## 2024-03-04 ENCOUNTER — NON-APPOINTMENT (OUTPATIENT)
Age: 49
End: 2024-03-04

## 2024-04-11 ENCOUNTER — APPOINTMENT (OUTPATIENT)
Dept: NEUROLOGY | Facility: CLINIC | Age: 49
End: 2024-04-11
Payer: COMMERCIAL

## 2024-04-11 DIAGNOSIS — M54.2 CERVICALGIA: ICD-10-CM

## 2024-04-11 PROCEDURE — G2211 COMPLEX E/M VISIT ADD ON: CPT

## 2024-04-11 PROCEDURE — 99214 OFFICE O/P EST MOD 30 MIN: CPT

## 2024-04-11 NOTE — DISCUSSION/SUMMARY
[FreeTextEntry1] : 47 y/o male w focal occipital epilepsy. doing well Recovering from cervical surgery, experiencing post-op pain and headaches  Memory loss. Multifactorial.

## 2024-04-11 NOTE — END OF VISIT
[FreeTextEntry3] : I, Lily Washington, attest that this documentation has been prepared under the direction in and the presence of provider Octavio Nielsen MD.

## 2024-04-11 NOTE — PHYSICAL EXAM
[FreeTextEntry1] : General: Constitutional: Sitting comfortably in NAD. Psychiatric: well-groomed, appropriate affect Ears, Nose, Throat: no abnormalities, mucus membranes moist Neck: supple. soft collar in place  Extremities: no edema, clubbing or cyanosis Skin: no rash or neuro-cutaneous signs  Cognitive: Orientation, language, memory and knowledge screens intact.  Cranial Nerves: II: BYRON. III/IV/VI: EOM Full. VII: Face appears symmetric. VIII: Normal to screening. IX/X: Normal phonation. XI: Trapezius Symmetric. XII: Tongue midline.  Motor: Tense neck muscles  Power: No pronator drift.  Normal gait.

## 2024-04-11 NOTE — HISTORY OF PRESENT ILLNESS
[FreeTextEntry1] : 4/11/24 HPI: Jeferson is a 48 year old male presenting for a follow up visit for seizures.  Fell on a slippery surface in January and injured his neck. Underwent anterior cervical surgery on 3/14. Painful post-op period, developed headaches. Start R side of his neck, radiate upward. Previously taking Tylenol around the clock. Now taking it just as needed. Head pain triggered by motion. Follow up with surgeon scheduled in two weeks, will hopefully start PT.   Difficulty sleeping due to pain.   Meds: CBZ 400mg BID (level 8.2) ---------------------------- Last seen 2/13/24: No seizures Rare sec flashes which represent auas. No LOC  Other medical issues: Fell on 12/30 during work. Fell down and injury spine. Says he needs surgery. He will have it done in a few weeks. Surgery [facility].  AED Meds: Carbamazepine brand  mg BID  PMHx Had a questionable seizure episode at work about 1 month ago (June 18th, 2018). Felt sick to the stomach and started to feel weak. Walked out of a work meeting and was walking to the bathroom and then fell. Lost consciousness for a few minutes. Denies any visual aura before the fall; also denies any tongue bite or bowel/bladder incontinence. This event was witnessed by a co-worker, who said that there was no shaking witnessed. The patient stayed on the ground for a few minutes before getting up on his own. Felt back to his baseline after 10 minutes.  Reports seeing visual aura about 1 time per week with a white/yellow colored light. Usually lasts 1-2 seconds. Reports that about 2 months ago, saw this visual aura on and off every few minutes for half of a day.  Normal seizure semiology: Sees a light and then it gets bigger and bigger to the point where it fills the whole visual field. Then progresses to vomiting and whole body shaking.  Reports getting occipital headaches (pressure in the back of the head) intermittently since the last visit. Takes Advil OTC, which alleviates the headache partially.  Reports memory loss / problems with short-term memory. Ex. cannot remember where he placed a drill after a few minutes (works as a ). Says this has been declining over 2-3 years. Neuro-psych testing is waiting to be approved by insurance. Not done yet   Was recently switched from the brand name Tegretol to the generic carbamazepine 9 months ago. He has many problems w the generic. Memory still bad.  Some anxiety.  HPI  No COVID. Got the first vaccine yesterday Stable No seizures  AED Meds:  Carbamazepine brand  mg tid   Job: slow. 1/day a week

## 2024-04-19 RX ORDER — GABAPENTIN 300 MG/1
300 CAPSULE ORAL
Qty: 30 | Refills: 1 | Status: ACTIVE | COMMUNITY
Start: 2024-04-11 | End: 1900-01-01

## 2024-08-13 ENCOUNTER — RX RENEWAL (OUTPATIENT)
Age: 49
End: 2024-08-13

## 2025-03-04 ENCOUNTER — APPOINTMENT (OUTPATIENT)
Dept: NEUROLOGY | Facility: CLINIC | Age: 50
End: 2025-03-04

## 2025-08-08 ENCOUNTER — RX RENEWAL (OUTPATIENT)
Age: 50
End: 2025-08-08